# Patient Record
Sex: FEMALE | NOT HISPANIC OR LATINO | Employment: STUDENT | URBAN - METROPOLITAN AREA
[De-identification: names, ages, dates, MRNs, and addresses within clinical notes are randomized per-mention and may not be internally consistent; named-entity substitution may affect disease eponyms.]

---

## 2023-06-26 ENCOUNTER — TRANSFERRED RECORDS (OUTPATIENT)
Dept: HEALTH INFORMATION MANAGEMENT | Facility: CLINIC | Age: 23
End: 2023-06-26
Payer: COMMERCIAL

## 2023-06-26 ENCOUNTER — MEDICAL CORRESPONDENCE (OUTPATIENT)
Dept: HEALTH INFORMATION MANAGEMENT | Facility: CLINIC | Age: 23
End: 2023-06-26
Payer: COMMERCIAL

## 2023-06-27 ENCOUNTER — TRANSCRIBE ORDERS (OUTPATIENT)
Dept: OTHER | Age: 23
End: 2023-06-27

## 2023-07-03 ENCOUNTER — TRANSCRIBE ORDERS (OUTPATIENT)
Dept: OTHER | Age: 23
End: 2023-07-03

## 2023-07-03 DIAGNOSIS — D64.9 ANEMIA: Primary | ICD-10-CM

## 2023-07-05 ENCOUNTER — PRE VISIT (OUTPATIENT)
Dept: ONCOLOGY | Facility: CLINIC | Age: 23
End: 2023-07-05
Payer: COMMERCIAL

## 2023-07-05 NOTE — TELEPHONE ENCOUNTER
RECORDS STATUS - ALL OTHER DIAGNOSIS    Hematology  Additional Information:  Anemia referred by [facility]  RECORDS RECEIVED FROM: [facility]    Appt Date: TBD NN WQ     Action    Action Taken 7/5/2023 12:15pm JONN     I called [facility] to request records and labs. Ph: [phone] #[phone] - they pulled up the pt's chart. They will send records to Fax: [phone] Attn: Malaika     7/6/2023 2:53pm KEUMER   Pt is no longer in the NN WQ.   I faxed the [email or web address] records to HIM and emailed them to the NN Team.       NOTES STATUS DETAILS   OFFICE NOTE from referring provider     OFFICE NOTE from medical oncologist Complete - [facility]     DISCHARGE SUMMARY from hospital     DISCHARGE REPORT from the ER     OPERATIVE REPORT     MEDICATION LIST     CLINICAL TRIAL TREATMENTS TO DATE     LABS     PATHOLOGY REPORTS     ANYTHING RELATED TO DIAGNOSIS Complete - [email or web address]     GENONOMIC TESTING     TYPE:     IMAGING (NEED IMAGES & REPORT)     CT SCANS     MRI     MAMMO     ULTRASOUND     PET

## 2023-07-07 ENCOUNTER — PATIENT OUTREACH (OUTPATIENT)
Dept: ONCOLOGY | Facility: CLINIC | Age: 23
End: 2023-07-07
Payer: COMMERCIAL

## 2023-07-07 NOTE — PROGRESS NOTES
[facility]: Hematology                                                                 Referral reviewed       ASSESSMENT      Clinical History (per Nurse review of records provided):    22 year old female patient with reported hx of anemia. Recent CBC at PCP referring clinic    Records Location: Faxed - Media tab/Scanned     Pertinent labs -- BOOKMARKED    Referring provider note(s)-- BOOKMARKED    INTERVENTION(S)                                                      -OUTGOING CALL to pt:   Introduced my role as nurse navigator with [facility] Hematology/Oncology dept and that we have recd the referral to hematologyfrom her PCP.  Identity verified. 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  Pt was warm-transferred to Oncology Patient Access rep to schedule the consultation.    -Referral Triage Scheduling Instructions added to Hem/Onc  referral for Patient Access rep ( below, hours are Monday - Friday 8am - 4:30 pm)     PLAN                                                      Hematology consult    Future Appointments   Date Time Provider Department Center   9/7/2023  2:00 PM Yeison Bond MD [facility]       See records team's Pre-Visit encounter documentation for additional records retrieval information.    Zarina Esposito, RN, BSN, OCN  Hematology/Oncology New Patient Nurse Navigator   [facility]  [phone]

## 2023-07-08 ENCOUNTER — OFFICE VISIT (OUTPATIENT)
Dept: FAMILY MEDICINE | Facility: CLINIC | Age: 23
End: 2023-07-08
Payer: COMMERCIAL

## 2023-07-08 VITALS
SYSTOLIC BLOOD PRESSURE: 106 MMHG | HEART RATE: 75 BPM | TEMPERATURE: 97.6 F | OXYGEN SATURATION: 99 % | DIASTOLIC BLOOD PRESSURE: 68 MMHG

## 2023-07-08 DIAGNOSIS — L23.7 ALLERGIC CONTACT DERMATITIS DUE TO PLANTS, EXCEPT FOOD: Primary | ICD-10-CM

## 2023-07-08 PROCEDURE — 99203 OFFICE O/P NEW LOW 30 MIN: CPT

## 2023-07-08 RX ORDER — CETIRIZINE HYDROCHLORIDE 10 MG/1
10 TABLET ORAL DAILY
Qty: 90 TABLET | Refills: 0 | Status: SHIPPED | OUTPATIENT
Start: 2023-07-08 | End: 2023-11-30

## 2023-07-08 ASSESSMENT — ENCOUNTER SYMPTOMS
MUSCULOSKELETAL NEGATIVE: 1
CONSTITUTIONAL NEGATIVE: 1
NEUROLOGICAL NEGATIVE: 1

## 2023-07-08 NOTE — PROGRESS NOTES
Assessment & Plan       ICD-10-CM    1. Allergic contact dermatitis due to plants, except food  L23.7 cetirizine (ZYRTEC) 10 MG tablet           You can take over the counter antihistamines like Allegra, Claritin, Zyrtec or Xyzal for the itching. Take 2x as many pills as the bottle says to take. So if it says take one pill once a day, take 2 pills once a day. If it says take 1 pill twice a day, take 2-3 pills twice a day. Do this just as needed for initial control over allergies (about 3-7 days), then follow 1 per day as directed on bottle. If needed, I also recommend Flonase or Nasonex, 1 spray into each nostril once her day. Avoid Afrin as this can lead to rebound congestion. Use humidifer at night.     Return if symptoms worsen or fail to improve, for Follow up.    At the end of the encounter, I discussed results, diagnosis, medications. Discussed red flags for immediate return to clinic/ER, as well as indications for follow up if no improvement. Patient understood and agreed to plan. Patient was stable for discharge.    Subjective     Manuel is a 22 year old female who presents to clinic today for the following health issues:  Chief Complaint   Patient presents with     Urgent Care     Derm Problem     Possible allergy. Pt just move [place] about a month ago and since has been having flare up under her eyes and around her lips. It itch and it is red     Nour presents with reports of possible allergies x 1 month. She reports she moved here one month ago and since then has noticed itching and redness around her lips and eyes. She reports there is itching. She has tried cortisone around the mouth which offers minimal relief. She denies shortness of breath or trouble breathing.           Review of Systems   Constitutional: Negative.    HENT: Negative for congestion.    Musculoskeletal: Negative.    Skin: Positive for rash.   Neurological: Negative.        Problem List:  There are no relevant problems documented for this  patient.      No past medical history on file.    Social History     Tobacco Use     Smoking status: Never     Passive exposure: Never     Smokeless tobacco: Never   Substance Use Topics     Alcohol use: Not on file           Objective    /68   Pulse 75   Temp 97.6  F (36.4  C) (Tympanic)   LMP 06/09/2023   SpO2 99%   Physical Exam  Constitutional:       Appearance: Normal appearance.   HENT:      Head: Normocephalic and atraumatic.   Musculoskeletal:      Cervical back: Normal range of motion and neck supple.   Skin:     General: Skin is warm and dry.   Neurological:      General: No focal deficit present.      Mental Status: She is alert and oriented to person, place, and time.   Psychiatric:         Mood and Affect: Mood normal.         Behavior: Behavior normal.         Thought Content: Thought content normal.         Judgment: Judgment normal.              Raheem Styles PA-C

## 2023-07-10 ENCOUNTER — NURSE TRIAGE (OUTPATIENT)
Dept: NURSING | Facility: CLINIC | Age: 23
End: 2023-07-10
Payer: COMMERCIAL

## 2023-07-11 NOTE — TELEPHONE ENCOUNTER
Nurse Triage SBAR    Is this a 2nd Level Triage? No    Situation: Patient states that she woke with an allergic reaction today, was seen at [facility], provided a medication to help with symptoms. Patient states that she has had return of puffiness of both eyes, under eyes are puffy, red and itchy, is red and itchy around the mouth.   Patient was told to take zyrtec.     Background: Was also seen in walk in clinic on 7/8/23.    Assessment:   Both eyelids are equally swollen    Recommendation: Per disposition, See PCP within 3 days. Home care advice provided. Advised patient to call back with any new or worsening symptoms. Patient verbalized understanding and agrees with plan.    Protocol Recommended Disposition: Routine office follow-up appointment     Marichuy Boswell RN on 7/10/2023 at 8:28 PM  [facility]    Reason for Disposition    Eyelid swelling    [1] MILD eyelid swelling (puffiness) AND [2] persists > 3 days  (Exception: suspect mosquito bites)    Additional Information    Negative: Life-threatening allergic reaction (anaphylaxis) suspected    Negative: Tongue swelling is main symptom    Negative: Lip swelling is main symptom    Negative: Face swelling    Negative: Asthma attack triggered by pollen or other allergen    Negative: [1] Bee sting AND [2] widespread hives or swelling    Negative: [1] Hives AND [2] not from bee sting or prescription drug    Negative: [1] Drug allergy suspected AND [2] taking prescription medicine AND [3] widespread rash    Negative: Coughing from pollen or other allergen (allergic cough suspected)    Negative: Unresponsive, passed out or very weak    Negative: Difficulty breathing or wheezing    Negative: [1] Difficulty swallowing or slurred speech AND [2] sudden onset    Negative: Sounds like a life-threatening emergency to the triager    Negative: Recent injury to the eye    Negative: Entire face is swollen    Negative: Sacs of clear fluid (blisters) on  "whites of eyes (allergic cysts)    Negative: Contact with pollen, other allergic substance or eyedrops    Negative: [1] Bee sting AND [2] within last 24 hours    Negative: Insect bite suspected    Negative: Sty suspected (small, painful red lump present on lid margin    Negative: Yellow or green discharge (pus) in the eye    Negative: Redness of white area (sclera) of eye(s)    Negative: [1] SEVERE eyelid swelling (i.e., shut or almost) AND [2] fever    Negative: [1] Eyelid (outer) is very red AND [2] fever    Negative: Patient sounds very sick or weak to the triager    Negative: [1] Pregnant 20 or more weeks AND [2] sudden weight gain (e.g., more than 3 lbs or 1.4 kg in one week)    Negative: [1] SEVERE eyelid swelling (i.e., shut or almost) AND [2] involves both eyes (Exception: itchy eyes, which  are probably an allergic reaction)    Negative: [1] SEVERE eyelid swelling (i.e., shut or almost) AND [2] Taking an ACE Inhibitor medication (e.g., benazepril/LOTENSIN, captopril/CAPOTEN, enalapril/VASOTEC, lisinopril/ZESTRIL)    Negative: [1] SEVERE eyelid swelling on one side AND [2] red and painful (or tender to touch)    Negative: [1] SEVERE eyelid swelling on one side AND [2] sinus pain or pressure    Negative: [1] MILD swelling AND [2] fever    Negative: [1] Painful rash AND [2] multiple small blisters grouped together (i.e., dermatomal distribution or \"band\" or \"stripe\")    Negative: [1] SEVERE eyelid swelling (i.e., shut or almost) AND [2] involves both eyes AND [3] itchy    Negative: MODERATE-SEVERE eyelid swelling on one side  (Exception: due to a mosquito bite)    Negative: [1] MILD eyelid swelling (puffiness) AND [2] sinus pain or pressure    Negative: Eyelid is red and painful (or tender to touch)    Negative: Swelling of both lower legs (i.e., bilateral pedal edema)    Protocols used: ALLERGIC REACTIONS - GUIDELINE [id number]-LB, EYE - SWELLING-A-      "

## 2023-10-22 ENCOUNTER — HEALTH MAINTENANCE LETTER (OUTPATIENT)
Age: 23
End: 2023-10-22

## 2023-11-30 ENCOUNTER — ONCOLOGY VISIT (OUTPATIENT)
Dept: ONCOLOGY | Facility: CLINIC | Age: 23
End: 2023-11-30
Payer: COMMERCIAL

## 2023-11-30 VITALS
DIASTOLIC BLOOD PRESSURE: 68 MMHG | HEART RATE: 74 BPM | SYSTOLIC BLOOD PRESSURE: 109 MMHG | WEIGHT: 152.2 LBS | TEMPERATURE: 97.8 F | OXYGEN SATURATION: 98 % | RESPIRATION RATE: 16 BRPM

## 2023-11-30 DIAGNOSIS — D56.3 BETA THALASSEMIA MINOR: Primary | ICD-10-CM

## 2023-11-30 PROCEDURE — 99213 OFFICE O/P EST LOW 20 MIN: CPT | Performed by: INTERNAL MEDICINE

## 2023-11-30 PROCEDURE — 99204 OFFICE O/P NEW MOD 45 MIN: CPT | Performed by: INTERNAL MEDICINE

## 2023-11-30 RX ORDER — SERTRALINE HYDROCHLORIDE 25 MG/1
25 TABLET, FILM COATED ORAL DAILY
COMMUNITY
Start: 2023-11-03

## 2023-11-30 ASSESSMENT — PAIN SCALES - GENERAL: PAINLEVEL: NO PAIN (0)

## 2023-11-30 NOTE — PROGRESS NOTES
[facility]  New patient history and physical  11/30/2023    Reason for referral: Microcytic Anemia    Assessment and Plan:   Manuel Lange is a 22 year old female with Beta thalassemia minor.    Microcytic anemia  Heterozygous positive Beta-0 thalassemia minor, Hemoglobin Becerra (HBB:c.92G>C)  Family history of mother with compount heterozygous thalassemia with Hemoglobin Becerra Codon 30 (G>C) and Hemoglobin O-[place] codon 121 (G>A)    Discussion and Medical Decision Making (12/14/2023):  Today with Manuel I reviewed her recent beta thalassemia testing and diagnosis of beta 0 thalassemia minor.  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  We discussed the implications of this for Manuel, but as of right now she does not have any issues in terms of symptomatic anemia, but that with episodes of stress such as pregnancy, severe illness, or bleeding she may require transfusions.  We discussed that the endorgan effects of more severe beta thalassemia are things that we will likely not result from her state of beta thalassemia minor, however she should continue to have hemoglobin and iron levels checked as she could develop iron overload despite not taking iron supplementation.  We discussed that she should not take iron supplementation unless she is found to be iron deficient.  We also discussed that it is reasonable for her brother to pursue beta thalassemia testing despite not having anemia given that he likely inherited the other beta thalassemia gene from his mother and his spouse might have alpha thalassemia.  We discussed the implications of this for Manuel in the future if she were to have a child there is approximately 50% chance of her passing on her beta 0 thalassemia gene, and her partner should be tested to see if he has any thalassemia trait,  ideally prior to conception.  Given her medical background I also provided her with a recent review article on beta thalassemia.     Plan:  Check Iron studies, peripheral smear, VitD (at pts request)  RTC as needed    This plan was discussed with Dr. Stubbs .     Yeison Bond MD   PGY6 Hematology/Oncology Fellow     References:  ?-Thalassemias. N Engl J Med 2021; [id number].     History of Present Illness/Interval History:   Manuel Lange presents for evaluation of a microcytic anemia noted on CBC [facility].  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  She was subsequently seen by an OB/GYN who sent thalassemia testing and workup of this microcytic anemia which resulted showing heterozygous positive for beta 0 thalassemia, hemoglobin Becerra (HBB:c.92G>C).    Notably, her mother also has a chronic anemia, but she has had more symptomatic complications in the past.  During pregnancy she had severe anemia and required a blood transfusion after which she had what sounds like a hemolytic reaction, which at the time was thought to be due to inadequate matching.  However after repeat transfusion she again had a reaction.  After Manuel's test resulted consistent with beta 0 thalassemia her mother went to receive a genetic test back home in the [facility] which returned showing that she has a compound heterozygous beta thalassemia with hemoglobin Becerra Codon 30 (G>C) and also hemoglobin O-[place] codon 121 (G>A).  She also reports that her mother had been told that she has spherocytosis as well.    She reports she has 1 brother and he does not have any known anemia, though his fiancée has alpha thalassemia trait.    She reports she does not have any symptoms of bleeding such as gum bleeding, bruising, GI/ bleeding, or excessive menses.  She has not been taking any iron supplementation.    Subjective   No past  medical history on file.  No past surgical history on file.  Social History     Socioeconomic History    Marital status: Single     Spouse name: None    Number of children: None    Years of education: None    Highest education level: None   Tobacco Use    Smoking status: Never     Passive exposure: Never    Smokeless tobacco: Never      No family history on file.    Current Outpatient Medications   Medication Sig    sertraline (ZOLOFT) 25 MG tablet Take 25 mg by mouth daily    sertraline (ZOLOFT) 50 MG tablet Take 50 mg by mouth daily     No current facility-administered medications for this visit.        Objective   Physical Exam:   Blood pressure 109/68, pulse 74, temperature 97.8  F (36.6  C), temperature source Oral, resp. rate 16, weight 69 kg (152 lb 3.2 oz), SpO2 98%.  Physical Exam  Constitutional:       General: She is not in acute distress.  HENT:      Head: Normocephalic and atraumatic.      Mouth/Throat:      Mouth: Mucous membranes are moist.   Eyes:      General: No scleral icterus.  Cardiovascular:      Rate and Rhythm: Normal rate and regular rhythm.   Pulmonary:      Effort: Pulmonary effort is normal. No respiratory distress.   Abdominal:      General: There is no distension.      Palpations: Abdomen is soft.   Skin:     General: Skin is warm and dry.   Neurological:      Mental Status: She is alert. Mental status is at baseline.         Data:     I personally reviewed the following studies:  No lab results found.  No lab results found.  No lab results found.  No lab results found.              No results found for this or any previous visit (from the past 24 hour(s)).

## 2023-11-30 NOTE — NURSING NOTE
Oncology Rooming Note    November 30, 2023 1:57 PM   Manuel Lange is a 22 year old female who presents for:    Chief Complaint   Patient presents with    Oncology Clinic Visit     Anemia     Initial Vitals: /68 (BP Location: Left arm, Patient Position: Sitting, Cuff Size: Adult Regular)   Pulse 74   Temp 97.8  F (36.6  C) (Oral)   Resp 16   Wt 69 kg (152 lb 3.2 oz)   SpO2 98%  There is no height or weight on file to calculate BMI. There is no height or weight on file to calculate BSA.  No Pain (0) Comment: Data Unavailable   No LMP recorded. (Menstrual status: Irregular Periods).  Allergies reviewed: Yes  Medications reviewed: Yes    Medications: Medication refills not needed today.  Pharmacy name entered into [employer]:    [id number] - [place] [id number] AT [facility] [address]  WRITTEN PRESCRIPTION REQUESTED    Clinical concerns: Pt is new.       Gayle Castano LPN  11/30/2023

## 2024-02-05 ENCOUNTER — HOSPITAL ENCOUNTER (EMERGENCY)
Facility: CLINIC | Age: 24
Discharge: HOME | End: 2024-02-05
Attending: EMERGENCY MEDICINE | Admitting: EMERGENCY MEDICINE
Payer: COMMERCIAL

## 2024-02-05 VITALS
DIASTOLIC BLOOD PRESSURE: 60 MMHG | RESPIRATION RATE: 16 BRPM | SYSTOLIC BLOOD PRESSURE: 95 MMHG | HEART RATE: 64 BPM | HEIGHT: 64 IN | TEMPERATURE: 97.8 F | BODY MASS INDEX: 26.13 KG/M2 | OXYGEN SATURATION: 100 %

## 2024-02-05 DIAGNOSIS — R11.2 NAUSEA VOMITING AND DIARRHEA: ICD-10-CM

## 2024-02-05 DIAGNOSIS — R19.7 NAUSEA VOMITING AND DIARRHEA: ICD-10-CM

## 2024-02-05 LAB
ACANTHOCYTES BLD QL SMEAR: ABNORMAL
ALBUMIN SERPL BCG-MCNC: 4.6 G/DL (ref 3.5–5.2)
ALP SERPL-CCNC: 48 U/L (ref 40–150)
ALT SERPL W P-5'-P-CCNC: 12 U/L (ref 0–50)
ANION GAP SERPL CALCULATED.3IONS-SCNC: 8 MMOL/L (ref 7–15)
AST SERPL W P-5'-P-CCNC: 18 U/L (ref 0–45)
AUER BODIES BLD QL SMEAR: ABNORMAL
BASO STIPL BLD QL SMEAR: ABNORMAL
BASOPHILS # BLD AUTO: 0 10E3/UL (ref 0–0.2)
BASOPHILS NFR BLD AUTO: 0 %
BILIRUB SERPL-MCNC: 0.7 MG/DL
BITE CELLS BLD QL SMEAR: ABNORMAL
BLISTER CELLS BLD QL SMEAR: ABNORMAL
BUN SERPL-MCNC: 16.2 MG/DL (ref 6–20)
BURR CELLS BLD QL SMEAR: ABNORMAL
CALCIUM SERPL-MCNC: 9.1 MG/DL (ref 8.6–10)
CHLORIDE SERPL-SCNC: 105 MMOL/L (ref 98–107)
CREAT SERPL-MCNC: 0.69 MG/DL (ref 0.51–0.95)
DACRYOCYTES BLD QL SMEAR: SLIGHT
DEPRECATED HCO3 PLAS-SCNC: 24 MMOL/L (ref 22–29)
EGFRCR SERPLBLD CKD-EPI 2021: >90 ML/MIN/1.73M2
ELLIPTOCYTES BLD QL SMEAR: SLIGHT
EOSINOPHIL # BLD AUTO: 0.1 10E3/UL (ref 0–0.7)
EOSINOPHIL NFR BLD AUTO: 1 %
ERYTHROCYTE [DISTWIDTH] IN BLOOD BY AUTOMATED COUNT: 18.7 % (ref 10–15)
FRAGMENTS BLD QL SMEAR: SLIGHT
GLUCOSE SERPL-MCNC: 99 MG/DL (ref 70–99)
HCT VFR BLD AUTO: 36.7 % (ref 35–47)
HGB BLD-MCNC: 11.2 G/DL (ref 11.7–15.7)
HGB C CRYSTALS: ABNORMAL
HOLD SPECIMEN: NORMAL
HOWELL-JOLLY BOD BLD QL SMEAR: ABNORMAL
IMM GRANULOCYTES # BLD: 0 10E3/UL
IMM GRANULOCYTES NFR BLD: 0 %
LIPASE SERPL-CCNC: 21 U/L (ref 13–60)
LYMPHOCYTES # BLD AUTO: 0.8 10E3/UL (ref 0.8–5.3)
LYMPHOCYTES NFR BLD AUTO: 13 %
MCH RBC QN AUTO: 19.6 PG (ref 26.5–33)
MCHC RBC AUTO-ENTMCNC: 30.5 G/DL (ref 31.5–36.5)
MCV RBC AUTO: 64 FL (ref 78–100)
MONOCYTES # BLD AUTO: 0.4 10E3/UL (ref 0–1.3)
MONOCYTES NFR BLD AUTO: 6 %
NEUTROPHILS # BLD AUTO: 5.1 10E3/UL (ref 1.6–8.3)
NEUTROPHILS NFR BLD AUTO: 80 %
NEUTS HYPERSEG BLD QL SMEAR: ABNORMAL
NRBC # BLD AUTO: 0 10E3/UL
NRBC BLD AUTO-RTO: 0 /100
PLAT MORPH BLD: ABNORMAL
PLATELET # BLD AUTO: 192 10E3/UL (ref 150–450)
POLYCHROMASIA BLD QL SMEAR: SLIGHT
POTASSIUM SERPL-SCNC: 4.3 MMOL/L (ref 3.4–5.3)
PROT SERPL-MCNC: 7.2 G/DL (ref 6.4–8.3)
RBC # BLD AUTO: 5.72 10E6/UL (ref 3.8–5.2)
RBC AGGLUT BLD QL: ABNORMAL
RBC MORPH BLD: ABNORMAL
ROULEAUX BLD QL SMEAR: ABNORMAL
SICKLE CELLS BLD QL SMEAR: ABNORMAL
SMUDGE CELLS BLD QL SMEAR: ABNORMAL
SODIUM SERPL-SCNC: 137 MMOL/L (ref 135–145)
SPHEROCYTES BLD QL SMEAR: ABNORMAL
STOMATOCYTES BLD QL SMEAR: ABNORMAL
TARGETS BLD QL SMEAR: SLIGHT
TOXIC GRANULES BLD QL SMEAR: ABNORMAL
VARIANT LYMPHS BLD QL SMEAR: ABNORMAL
WBC # BLD AUTO: 6.4 10E3/UL (ref 4–11)

## 2024-02-05 PROCEDURE — 250N000011 HC RX IP 250 OP 636: Performed by: EMERGENCY MEDICINE

## 2024-02-05 PROCEDURE — 99284 EMERGENCY DEPT VISIT MOD MDM: CPT | Performed by: EMERGENCY MEDICINE

## 2024-02-05 PROCEDURE — 36415 COLL VENOUS BLD VENIPUNCTURE: CPT | Performed by: EMERGENCY MEDICINE

## 2024-02-05 PROCEDURE — 258N000003 HC RX IP 258 OP 636: Performed by: EMERGENCY MEDICINE

## 2024-02-05 PROCEDURE — 82040 ASSAY OF SERUM ALBUMIN: CPT | Performed by: EMERGENCY MEDICINE

## 2024-02-05 PROCEDURE — 96361 HYDRATE IV INFUSION ADD-ON: CPT | Performed by: EMERGENCY MEDICINE

## 2024-02-05 PROCEDURE — 85025 COMPLETE CBC W/AUTO DIFF WBC: CPT | Performed by: EMERGENCY MEDICINE

## 2024-02-05 PROCEDURE — 250N000013 HC RX MED GY IP 250 OP 250 PS 637: Performed by: EMERGENCY MEDICINE

## 2024-02-05 PROCEDURE — 83690 ASSAY OF LIPASE: CPT | Performed by: EMERGENCY MEDICINE

## 2024-02-05 PROCEDURE — 96374 THER/PROPH/DIAG INJ IV PUSH: CPT | Performed by: EMERGENCY MEDICINE

## 2024-02-05 PROCEDURE — 99284 EMERGENCY DEPT VISIT MOD MDM: CPT | Mod: 25 | Performed by: EMERGENCY MEDICINE

## 2024-02-05 RX ORDER — ACETAMINOPHEN 325 MG/1
975 TABLET ORAL ONCE
Status: COMPLETED | OUTPATIENT
Start: 2024-02-05 | End: 2024-02-05

## 2024-02-05 RX ORDER — ONDANSETRON 4 MG/1
4 TABLET, ORALLY DISINTEGRATING ORAL EVERY 8 HOURS PRN
Qty: 10 TABLET | Refills: 0 | Status: SHIPPED | OUTPATIENT
Start: 2024-02-05

## 2024-02-05 RX ORDER — ONDANSETRON 2 MG/ML
4 INJECTION INTRAMUSCULAR; INTRAVENOUS EVERY 30 MIN PRN
Status: DISCONTINUED | OUTPATIENT
Start: 2024-02-05 | End: 2024-02-05 | Stop reason: HOSPADM

## 2024-02-05 RX ADMIN — ACETAMINOPHEN 975 MG: 325 TABLET, FILM COATED ORAL at 10:41

## 2024-02-05 RX ADMIN — ONDANSETRON 4 MG: 2 INJECTION INTRAMUSCULAR; INTRAVENOUS at 10:36

## 2024-02-05 RX ADMIN — SODIUM CHLORIDE 1000 ML: 9 INJECTION, SOLUTION INTRAVENOUS at 10:36

## 2024-02-05 ASSESSMENT — ACTIVITIES OF DAILY LIVING (ADL): ADLS_ACUITY_SCORE: 33

## 2024-02-05 NOTE — DISCHARGE INSTRUCTIONS
Your lab tests are normal  Drink plenty of fluids to prevent dehydration  Use Zofran for nausea and vomiting  Follow-up with your primary care provider or the ER if you are not doing well

## 2024-02-05 NOTE — ED TRIAGE NOTES
"Pt coming from work with abdominal pain, N/V/D that started this morning. Pt states she \"may have gotten food poisoning.\"     Triage Assessment (Adult)       Row Name 02/05/24 1001          Triage Assessment    Airway WDL WDL        Respiratory WDL    Respiratory WDL WDL        Cardiac WDL    Cardiac WDL WDL        Peripheral/Neurovascular WDL    Peripheral Neurovascular WDL WDL        Cognitive/Neuro/Behavioral WDL    Cognitive/Neuro/Behavioral WDL WDL                     "

## 2024-02-05 NOTE — ED PROVIDER NOTES
"    [place] EMERGENCY DEPARTMENT ([facility])    2/05/24       ED PROVIDER NOTE       History     Chief Complaint   Patient presents with    Nausea, Vomiting, & Diarrhea    Abdominal Pain     The history is provided by the patient and medical records.     Manuel Lange is a 23 year old female with past H. pylori 3 years ago who presents to the emergency department with severe nausea, vomiting, abdominal pain, lightheadedness, and diarrhea for the past month.  Patient states that she began having loose stools in mid December.  It has progressively gotten worse, and has had roughly 2 episodes of diarrhea daily for the past month.  She states that she woke up today and her abdominal pain was significantly worse.  She denies past medical history or chronic medical problems other than having tested positive for H. pylori 3 years ago and not receiving a full course of antibiotic treatment for it because she was unable to tolerate the medication.  She has not taken any recent antibiotics.  She does not have a gastritis or GERD.  Her PCP is Marielle at the [facility].  She does not believe she is pregnant.    Past Medical History  No past medical history on file.  No past surgical history on file.  ondansetron (ZOFRAN ODT) 4 MG ODT tab  sertraline (ZOLOFT) 25 MG tablet  sertraline (ZOLOFT) 50 MG tablet      No Known Allergies  Family History  No family history on file.  Social History   Social History     Tobacco Use    Smoking status: Never     Passive exposure: Never    Smokeless tobacco: Never         A complete review of systems was performed with pertinent positives and negatives noted in the HPI, and all other systems negative.    Physical Exam   BP: 111/75  Pulse: 99  Temp: 97.8  F (36.6  C)  Resp: 16  Height: 162.6 cm (5' 4\")  SpO2: 99 %  Physical Exam  Vitals and nursing note reviewed.   Constitutional:       General: She is not in acute distress.  Cardiovascular:      Rate and Rhythm: Regular rhythm. "   Pulmonary:      Breath sounds: Normal breath sounds.   Abdominal:      General: Abdomen is flat.      Palpations: Abdomen is soft.   Skin:     General: Skin is warm.   Neurological:      Mental Status: She is alert and oriented to person, place, and time.   Psychiatric:         Mood and Affect: Mood normal.         Behavior: Behavior normal.           ED Course, Procedures, & Data      Procedures         Medications   ondansetron (ZOFRAN) injection 4 mg (4 mg Intravenous $Given 2/5/24 1036)   sodium chloride 0.9% BOLUS 1,000 mL (0 mLs Intravenous Stopped 2/5/24 1153)   acetaminophen (TYLENOL) tablet 975 mg (975 mg Oral $Given 2/5/24 1041)          Results for orders placed or performed during the hospital encounter of 02/05/24   Comprehensive metabolic panel     Status: Normal   Result Value Ref Range    Sodium 137 135 - 145 mmol/L    Potassium 4.3 3.4 - 5.3 mmol/L    Carbon Dioxide (CO2) 24 22 - 29 mmol/L    Anion Gap 8 7 - 15 mmol/L    Urea Nitrogen 16.2 6.0 - 20.0 mg/dL    Creatinine 0.69 0.51 - 0.95 mg/dL    GFR Estimate >90 >60 mL/min/1.73m2    Calcium 9.1 8.6 - 10.0 mg/dL    Chloride 105 98 - 107 mmol/L    Glucose 99 70 - 99 mg/dL    Alkaline Phosphatase 48 40 - 150 U/L    AST 18 0 - 45 U/L    ALT 12 0 - 50 U/L    Protein Total 7.2 6.4 - 8.3 g/dL    Albumin 4.6 3.5 - 5.2 g/dL    Bilirubin Total 0.7 <=1.2 mg/dL   Lipase     Status: Normal   Result Value Ref Range    Lipase 21 13 - 60 U/L   CBC with platelets and differential     Status: Abnormal   Result Value Ref Range    WBC Count 6.4 4.0 - 11.0 10e3/uL    RBC Count 5.72 (H) 3.80 - 5.20 10e6/uL    Hemoglobin 11.2 (L) 11.7 - 15.7 g/dL    Hematocrit 36.7 35.0 - 47.0 %    MCV 64 (L) 78 - 100 fL    MCH 19.6 (L) 26.5 - 33.0 pg    MCHC 30.5 (L) 31.5 - 36.5 g/dL    RDW 18.7 (H) 10.0 - 15.0 %    Platelet Count 192 150 - 450 10e3/uL    % Neutrophils 80 %    % Lymphocytes 13 %    % Monocytes 6 %    % Eosinophils 1 %    % Basophils 0 %    % Immature Granulocytes 0 %     NRBCs per 100 WBC 0 <1 /100    Absolute Neutrophils 5.1 1.6 - 8.3 10e3/uL    Absolute Lymphocytes 0.8 0.8 - 5.3 10e3/uL    Absolute Monocytes 0.4 0.0 - 1.3 10e3/uL    Absolute Eosinophils 0.1 0.0 - 0.7 10e3/uL    Absolute Basophils 0.0 0.0 - 0.2 10e3/uL    Absolute Immature Granulocytes 0.0 <=0.4 10e3/uL    Absolute NRBCs 0.0 10e3/uL   [place] Draw     Status: None    Narrative    The following orders were created for panel order [place] Draw.  Procedure                               Abnormality         Status                     ---------                               -----------         ------                     Extra Red Top Tube[[id number]]                               Final result                 Please view results for these tests on the individual orders.   Extra Red Top Tube     Status: None   Result Value Ref Range    Hold Specimen [facility]    RBC and Platelet Morphology     Status: Abnormal   Result Value Ref Range    Platelet Assessment  Automated Count Confirmed. Platelet morphology is normal.     Automated Count Confirmed. Platelet morphology is normal.    Acanthocytes      Bobby Rods      Basophilic Stippling      Bite Cells      Blister Cells      [place] Cells      Elliptocytes Slight (A) None Seen    Hgb C Crystals      Swartz-Jolly Bodies      Hypersegmented Neutrophils      Polychromasia Slight (A) None Seen    RBC agglutination      RBC Fragments Slight (A) None Seen    Reactive Lymphocytes      Rouleaux      Sickle Cells      Smudge Cells      Spherocytes      Stomatocytes      Target Cells Slight (A) None Seen    Teardrop Cells Slight (A) None Seen    Toxic Neutrophils      RBC Morphology Confirmed RBC Indices    CBC with platelets differential     Status: Abnormal    Narrative    The following orders were created for panel order CBC with platelets differential.  Procedure                               Abnormality         Status                     ---------                               -----------          ------                     CBC with platelets and d...[[id number]]  Abnormal            Final result               RBC and Platelet Morphology[[id number]]  Abnormal            Final result                 Please view results for these tests on the individual orders.     Medications   ondansetron (ZOFRAN) injection 4 mg (4 mg Intravenous $Given 2/5/24 1036)   sodium chloride 0.9% BOLUS 1,000 mL (0 mLs Intravenous Stopped 2/5/24 1153)   acetaminophen (TYLENOL) tablet 975 mg (975 mg Oral $Given 2/5/24 1041)     Labs Ordered and Resulted from Time of ED Arrival to Time of ED Departure   CBC WITH PLATELETS AND DIFFERENTIAL - Abnormal       Result Value    WBC Count 6.4      RBC Count 5.72 (*)     Hemoglobin 11.2 (*)     Hematocrit 36.7      MCV 64 (*)     MCH 19.6 (*)     MCHC 30.5 (*)     RDW 18.7 (*)     Platelet Count 192      % Neutrophils 80      % Lymphocytes 13      % Monocytes 6      % Eosinophils 1      % Basophils 0      % Immature Granulocytes 0      NRBCs per 100 WBC 0      Absolute Neutrophils 5.1      Absolute Lymphocytes 0.8      Absolute Monocytes 0.4      Absolute Eosinophils 0.1      Absolute Basophils 0.0      Absolute Immature Granulocytes 0.0      Absolute NRBCs 0.0     RBC AND PLATELET MORPHOLOGY - Abnormal    Platelet Assessment        Value: Automated Count Confirmed. Platelet morphology is normal.    Acanthocytes        Bobby Rods        Basophilic Stippling        Bite Cells        Blister Cells        [place] Cells        Elliptocytes Slight (*)     Hgb C Crystals        Swartz-Jolly Bodies        Hypersegmented Neutrophils        Polychromasia Slight (*)     RBC agglutination        RBC Fragments Slight (*)     Reactive Lymphocytes        Rouleaux        Sickle Cells        Smudge Cells        Spherocytes        Stomatocytes        Target Cells Slight (*)     Teardrop Cells Slight (*)     Toxic Neutrophils        RBC Morphology Confirmed RBC Indices     COMPREHENSIVE METABOLIC PANEL - Normal     Sodium 137      Potassium 4.3      Carbon Dioxide (CO2) 24      Anion Gap 8      Urea Nitrogen 16.2      Creatinine 0.69      GFR Estimate >90      Calcium 9.1      Chloride 105      Glucose 99      Alkaline Phosphatase 48      AST 18      ALT 12      Protein Total 7.2      Albumin 4.6      Bilirubin Total 0.7     LIPASE - Normal    Lipase 21       No orders to display          Critical care was not performed.     Medical Decision Making  The patient's presentation was of low complexity (an acute and uncomplicated illness or injury).    The patient's evaluation involved:  ordering and/or review of 3+ test(s) in this encounter (CBC, metabolic panel)    The patient's management necessitated moderate risk (prescription drug management including medications given in the ED).    Assessment & Plan    22-year-old female presents complaining of nausea vomiting and diarrhea without significant abdominal pain.  We checked her labs here which are unremarkable she does have abnormal red blood cell indices but apparently has thalassemia.  She is not having urinary symptoms no recent antibiotic use.  She was given IV fluids and was given a message that she would like to be discharged I will provide a prescription for Zofran and recommend primary clinic follow-up or back in the ER if not doing well.    I have reviewed the nursing notes. I have reviewed the findings, diagnosis, plan and need for follow up with the patient.    New Prescriptions    ONDANSETRON (ZOFRAN ODT) 4 MG ODT TAB    Take 1 tablet (4 mg) by mouth every 8 hours as needed for nausea       Final diagnoses:   Nausea vomiting and diarrhea       IAlhaji, am serving as a trained medical scribe to document services personally performed by Manav Walsh MD based on the provider's statements to me on February 5, 2024.  This document has been checked and approved by the attending provider.    I, Manav Walsh MD, was physically present and  have reviewed and verified the accuracy of this note documented by Alhaji Pool, medical scribe.      Manav Walsh MD   [facility] EMERGENCY DEPARTMENT  2/5/2024     Manav Walsh MD  02/05/24 [id number]

## 2024-05-24 ENCOUNTER — E-VISIT (OUTPATIENT)
Dept: URGENT CARE | Facility: CLINIC | Age: 24
End: 2024-05-24
Payer: COMMERCIAL

## 2024-05-24 DIAGNOSIS — J06.9 UPPER RESPIRATORY TRACT INFECTION, UNSPECIFIED TYPE: Primary | ICD-10-CM

## 2024-05-24 PROCEDURE — 99207 PR NON-BILLABLE SERV PER CHARTING: CPT | Performed by: NURSE PRACTITIONER

## 2024-05-25 NOTE — PATIENT INSTRUCTIONS
Dear Manuel Lange,    We are sorry you are not feeling well. Based on the responses you provided, you may be experiencing a serious health condition that needs immediate in-person attention. It is recommended that you be seen in the emergency room in order to better evaluate your symptoms. Please click here to find the nearest Emergency Room.     Maru Bermeo, NP

## 2024-12-15 ENCOUNTER — HEALTH MAINTENANCE LETTER (OUTPATIENT)
Age: 24
End: 2024-12-15